# Patient Record
Sex: MALE | Race: WHITE | NOT HISPANIC OR LATINO
[De-identification: names, ages, dates, MRNs, and addresses within clinical notes are randomized per-mention and may not be internally consistent; named-entity substitution may affect disease eponyms.]

---

## 2019-05-22 PROBLEM — Z00.00 ENCOUNTER FOR PREVENTIVE HEALTH EXAMINATION: Status: ACTIVE | Noted: 2019-05-22

## 2019-06-21 ENCOUNTER — APPOINTMENT (OUTPATIENT)
Dept: UROLOGY | Facility: CLINIC | Age: 54
End: 2019-06-21
Payer: COMMERCIAL

## 2019-06-21 VITALS
DIASTOLIC BLOOD PRESSURE: 83 MMHG | HEIGHT: 70 IN | WEIGHT: 190 LBS | SYSTOLIC BLOOD PRESSURE: 129 MMHG | TEMPERATURE: 98.3 F | HEART RATE: 69 BPM | RESPIRATION RATE: 17 BRPM | BODY MASS INDEX: 27.2 KG/M2

## 2019-06-21 PROCEDURE — 99203 OFFICE O/P NEW LOW 30 MIN: CPT

## 2019-06-21 NOTE — PHYSICAL EXAM
[General Appearance - Well Developed] : well developed [General Appearance - Well Nourished] : well nourished [] : no respiratory distress [Exaggerated Use Of Accessory Muscles For Inspiration] : no accessory muscle use [Abdomen Tenderness] : non-tender [Respiration, Rhythm And Depth] : normal respiratory rhythm and effort [Abdomen Soft] : soft [No Prostate Nodules] : no prostate nodules [Prostate Size ___ gm] : prostate size [unfilled] gm [Skin Turgor] : supple [FreeTextEntry1] : s

## 2019-06-21 NOTE — ASSESSMENT
[FreeTextEntry1] : 54M presents for urinary frequency, incomplete emptying, worsening over the last year.\par -Flomax 0.4 mg daily\par -Urine culture\par -PVR 90 cc

## 2019-06-21 NOTE — HISTORY OF PRESENT ILLNESS
[FreeTextEntry1] : 54M presents for urinary frequency increased over the last year. PSA done by PCP who also examined. Last PSA (4/19) was 0.33 and Cr 0.8. Frequency x 20. No incomplete emptying, fevers/chills, nausea/vomiting, gross hematuria, dysuria. Cut down 1 coffee/day and still experiencing frequency. Currently avoiding hydration 2/2 frequency. 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

## 2019-06-24 LAB — BACTERIA UR CULT: NORMAL

## 2019-12-03 ENCOUNTER — APPOINTMENT (OUTPATIENT)
Dept: UROLOGY | Facility: CLINIC | Age: 54
End: 2019-12-03
Payer: COMMERCIAL

## 2019-12-03 DIAGNOSIS — R39.9 UNSPECIFIED SYMPTOMS AND SIGNS INVOLVING THE GENITOURINARY SYSTEM: ICD-10-CM

## 2019-12-03 DIAGNOSIS — R39.15 URGENCY OF URINATION: ICD-10-CM

## 2019-12-03 DIAGNOSIS — R35.0 FREQUENCY OF MICTURITION: ICD-10-CM

## 2019-12-03 DIAGNOSIS — N39.41 URGE INCONTINENCE: ICD-10-CM

## 2019-12-03 DIAGNOSIS — Z87.898 PERSONAL HISTORY OF OTHER SPECIFIED CONDITIONS: ICD-10-CM

## 2019-12-03 PROCEDURE — 99214 OFFICE O/P EST MOD 30 MIN: CPT

## 2019-12-03 RX ORDER — TADALAFIL 10 MG/1
10 TABLET, FILM COATED ORAL
Qty: 5 | Refills: 5 | Status: DISCONTINUED | COMMUNITY
Start: 2019-06-21 | End: 2019-12-03

## 2019-12-03 RX ORDER — MIRABEGRON 25 MG/1
25 TABLET, FILM COATED, EXTENDED RELEASE ORAL
Qty: 30 | Refills: 6 | Status: DISCONTINUED | COMMUNITY
Start: 2019-09-09 | End: 2019-12-03

## 2019-12-03 RX ORDER — TAMSULOSIN HYDROCHLORIDE 0.4 MG/1
0.4 CAPSULE ORAL
Qty: 30 | Refills: 6 | Status: DISCONTINUED | COMMUNITY
Start: 2019-06-21 | End: 2019-12-03

## 2019-12-03 NOTE — PHYSICAL EXAM
[General Appearance - Well Developed] : well developed [General Appearance - Well Nourished] : well nourished [Normal Appearance] : normal appearance [Well Groomed] : well groomed [General Appearance - In No Acute Distress] : no acute distress [Abdomen Soft] : soft [Costovertebral Angle Tenderness] : no ~M costovertebral angle tenderness [Abdomen Tenderness] : non-tender [Edema] : no peripheral edema [] : no respiratory distress [Respiration, Rhythm And Depth] : normal respiratory rhythm and effort [Exaggerated Use Of Accessory Muscles For Inspiration] : no accessory muscle use [Oriented To Time, Place, And Person] : oriented to person, place, and time [Affect] : the affect was normal [Mood] : the mood was normal [Not Anxious] : not anxious [Normal Station and Gait] : the gait and station were normal for the patient's age

## 2019-12-03 NOTE — ASSESSMENT
[FreeTextEntry1] : \par \par Impression/Plan:54 Y.O GENTLEMAN referred by Dr Goodrich for bothersome urinary urgency,frequency, rare UUI , tried and failed Tamsulosin,Myrbetriq in the past.Denies obstructive urinary ss. Last creatinine was 0.8, PSA 0.33 ,HGA1C was 5.9 from 4/15/19.Former smoker , quit 10 years ago. Denied any personal/ familial/ work stressors. Fluids :20 oz of coffee, 1 can of soda, no water .\par 1. RTO for UDS.VD given.

## 2019-12-03 NOTE — HISTORY OF PRESENT ILLNESS
[FreeTextEntry1] : \par 54 Y.O GENTLEMAN referred by Dr Goodrich for bothersome urinary urgency,frequency, rare UUI , tried and failed Tamsulosin,Myrbetriq in the past.Denies obstructive urinary ss. Last creatinine was 0.8, PSA 0.33 ,HGA1C was 5.9 from 4/15/19.Former smoker , quit 10 years ago. Denied any personal/ familial/ work stressors. Fluids :20 oz of coffee, 1 can of soda, no water .\par \par Evaluation of Voiding Symptoms:\par \par Force of stream: strong\par Hesitancy: 0\par Intermittency: 0\par Dribblin\par Daytime frequency: q 1h\par Night time frequency: 1-2\par Dysuria: 0\par Urgency: yes\par Urge Incontinence: rare\par Stress Incontinence: 0\par Pad usage: 0\par Straining to void: 0\par Incomplete emptyin\par UTIs:  0\par Hematuria: 0\par Stone disease:0\par STD: 0\par Bowel issues: 0\par VD.RTO UDS.\par

## 2019-12-20 ENCOUNTER — APPOINTMENT (OUTPATIENT)
Dept: UROLOGY | Facility: CLINIC | Age: 54
End: 2019-12-20

## 2020-03-03 ENCOUNTER — APPOINTMENT (OUTPATIENT)
Dept: UROLOGY | Facility: CLINIC | Age: 55
End: 2020-03-03